# Patient Record
Sex: MALE | Race: WHITE | NOT HISPANIC OR LATINO | Employment: OTHER | URBAN - METROPOLITAN AREA
[De-identification: names, ages, dates, MRNs, and addresses within clinical notes are randomized per-mention and may not be internally consistent; named-entity substitution may affect disease eponyms.]

---

## 2024-07-30 ENCOUNTER — TELEPHONE (OUTPATIENT)
Dept: FAMILY MEDICINE | Facility: CLINIC | Age: 24
End: 2024-07-30
Payer: COMMERCIAL

## 2024-07-30 NOTE — TELEPHONE ENCOUNTER
Reason for Call:  Appointment Request    Patient requesting this type of appt: Pre-op    Requested provider:  Any    Reason patient unable to be scheduled: Not within requested timeframe    When does patient want to be seen/preferred time: Same day    Comments: Requesting preop appt today 7/30 for left knee surgery due to torn meniscus (football related) at [facility] in [place] scheduled at 5AM tomorrow 7/31. PCP is with [place].    Patient was informed no availability today with providers (RN checked with IM and FM); Patient appreciated checking, but said he will try with [facility].s    Call taken on 7/30/2024 at 12:09 PM by Rico Madrigal RN